# Patient Record
(demographics unavailable — no encounter records)

---

## 2017-01-23 RX ORDER — ERGOCALCIFEROL 1.25 MG/1
CAPSULE ORAL
Qty: 12 CAPSULE | Refills: 0 | OUTPATIENT
Start: 2017-01-23

## 2023-10-18 NOTE — TELEPHONE ENCOUNTER
Pt requests call back to discuss lab results.    Therapy complete, patient can take OTC at this time.